# Patient Record
Sex: MALE | Race: WHITE | Employment: OTHER | URBAN - METROPOLITAN AREA
[De-identification: names, ages, dates, MRNs, and addresses within clinical notes are randomized per-mention and may not be internally consistent; named-entity substitution may affect disease eponyms.]

---

## 2020-09-14 ENCOUNTER — HOSPITAL ENCOUNTER (OUTPATIENT)
Dept: LAB | Facility: CLINIC | Age: 75
Discharge: HOME | End: 2020-09-14
Attending: PODIATRIST | Admitting: PODIATRIST
Payer: COMMERCIAL

## 2020-09-14 ENCOUNTER — OFFICE VISIT (OUTPATIENT)
Dept: PODIATRY | Facility: CLINIC | Age: 75
End: 2020-09-14
Payer: COMMERCIAL

## 2020-09-14 VITALS
SYSTOLIC BLOOD PRESSURE: 114 MMHG | BODY MASS INDEX: 37.19 KG/M2 | HEIGHT: 77 IN | DIASTOLIC BLOOD PRESSURE: 74 MMHG | WEIGHT: 315 LBS

## 2020-09-14 DIAGNOSIS — E66.01 MORBID OBESITY (H): ICD-10-CM

## 2020-09-14 DIAGNOSIS — L98.9 SKIN LESION OF FOOT: Primary | ICD-10-CM

## 2020-09-14 DIAGNOSIS — L98.9 SKIN LESION OF FOOT: ICD-10-CM

## 2020-09-14 DIAGNOSIS — M79.672 LEFT FOOT PAIN: ICD-10-CM

## 2020-09-14 PROCEDURE — 88312 SPECIAL STAINS GROUP 1: CPT | Performed by: PODIATRIST

## 2020-09-14 PROCEDURE — 88305 TISSUE EXAM BY PATHOLOGIST: CPT | Mod: 26 | Performed by: PODIATRIST

## 2020-09-14 PROCEDURE — 88312 SPECIAL STAINS GROUP 1: CPT | Mod: 26 | Performed by: PODIATRIST

## 2020-09-14 PROCEDURE — 11106 INCAL BX SKN SINGLE LES: CPT | Performed by: PODIATRIST

## 2020-09-14 PROCEDURE — 88305 TISSUE EXAM BY PATHOLOGIST: CPT | Performed by: PODIATRIST

## 2020-09-14 PROCEDURE — 99203 OFFICE O/P NEW LOW 30 MIN: CPT | Mod: 25 | Performed by: PODIATRIST

## 2020-09-14 ASSESSMENT — MIFFLIN-ST. JEOR: SCORE: 2439.97

## 2020-09-14 NOTE — PROGRESS NOTES
"  ASSESSMENT/PLAN:    Encounter Diagnoses   Name Primary?     Skin lesion of foot Yes     Left foot pain      Morbid obesity (H)      The hyperkeratotic tissue below the left third and fourth metatarsal heads is consistent with a callus from high pressure.  There was evidence of intraepidermal bleeding but no ulceration deep to this.    The skin lesion that is just distal to the callused area is different.  This was noted to be soft, boggy with macerated tissue.    Pairing of callus:     Using a #10 scalpel, the hyperkeratotic lesion plantar left foot was pared down to healthier normal underlying epithelium.    Incisional Skin Biopsy:    Due to the abnormal appearance of the soft tissues distal to the callus, excisional biopsy was recommended.  He was agreeable.  The area was prepped with alcohol solution.  Using a sterile tissue nippers, tissue was excised and placed in formalin.  It was sent to surgical pathology.  Bacitracin and a light dressing was applied.    I explained, depending on the pathology results, more investigation might be needed.  If this is a concerning lesion, a referral to Dr. Bean, dermatology, will be recommended.    We gave Rubén instructions for daily monitoring of the wound and wound cares.  The clinical signs of infection were reviewed.  He is to follow-up in clinic in 2 weeks.    All questions that he and his wife had were answered.      Body mass index is 41.5 kg/m .    Weight management plan: Patient was referred to their PCP to discuss a diet and exercise plan.      Marcelo Feliciano DPM, FACFAS, MS    [place] [facility]le [facility]      ____________________________________________________________________    HPI:         Chief Complaint: bruised foot on bottom and won't go away  Onset of problem: 5 months  Pain/ discomfort is described as:  \"hurts\"  Ratin/10   Frequency:  daily    The pain is made worse with standing, walking   Previous treatment: \"wife grinds " "it off\"    *There is no problem list on file for this patient.  *  *No past surgical history on file.*  *  No current outpatient medications on file.       ROS:     A 10-point review of systems was performed and is positive for that noted above in the HPI and as seen below.  All other areas are negative.     Numbness in feet?  yes   Calf pain with walking? no  Recent foot/ankle injury? no  Weight change over past 12 months? no  Self perception as overweight? yes  Recent flu-like symptoms? no  Joint pain other than feet ? no    Social History: Employment:  no;  Exercise/Physical activity:  no;  Tobacco use:  no  Social History     Socioeconomic History     Marital status:      Spouse name: Not on file     Number of children: Not on file     Years of education: Not on file     Highest education level: Not on file   Occupational History     Not on file   Social Needs     Financial resource strain: Not on file     Food insecurity     Worry: Not on file     Inability: Not on file     Transportation needs     Medical: Not on file     Non-medical: Not on file   Tobacco Use     Smoking status: Never Smoker     Smokeless tobacco: Never Used   Substance and Sexual Activity     Alcohol use: Not on file     Drug use: Not on file     Sexual activity: Not on file   Lifestyle     Physical activity     Days per week: Not on file     Minutes per session: Not on file     Stress: Not on file   Relationships     Social connections     Talks on phone: Not on file     Gets together: Not on file     Attends Restoration service: Not on file     Active member of club or organization: Not on file     Attends meetings of clubs or organizations: Not on file     Relationship status: Not on file     Intimate partner violence     Fear of current or ex partner: Not on file     Emotionally abused: Not on file     Physically abused: Not on file     Forced sexual activity: Not on file   Other Topics Concern     Not on file   Social History " "Narrative     Not on file       Family history:  No family history on file.    EXAM:    Vitals: /74   Ht 1.956 m (6' 5\")   Wt (!) 158.8 kg (350 lb)   BMI 41.50 kg/m    BMI: Body mass index is 41.5 kg/m .  Height: 6' 5\"    Constitutional/ general:  Pt is in no apparent distress, appears well-nourished.  Cooperative with history and physical exam.     Vascular:  Pedal pulses are faintly palpable bilaterally for both the DP and PT arteries.  CFT < 3 sec.  No edema.  Pedal hair growth noted.     Neuro:  Alert and oriented x 3. Coordinated gait.  Light touch sensation is diminished to the L4, L5, S1 distributions. No obvious deficits.  No evidence of neurological-based weakness, spasticity, or contracture in the lower extremities.     Derm: Thick hyperkeratotic lesion sub-left third and fourth metatarsal region.  Dark pigmentation suggesting intraepidermal bleeding.  Distal to this is a 1.5 cm in diameter area that is boggy feeling.  With debridement fluid did not drain.  A macerated, friable tissue was revealed.  Some of this was excised and sent to surgical pathology.  No deep wound or sinus tracts found.  No purulence erythema or edema.    Musculoskeletal:    Lower extremity muscle strength is normal.  Patient is ambulatory without an assistive device or brace .  No gross deformities.        "

## 2020-09-14 NOTE — PATIENT INSTRUCTIONS
Thank you for choosing [facility] / Foot & [facility]le Surgery!    [facility] [place] SCHEDULE SURGERY: [phone]   [address] BILLING QUESTIONS: [phone]   [place] [address] AFTER HOURS: [phone]   PH: [phone] [employer] [place] LINE:[phone]   FAX: [phone] APPOINTMENTS: [phone]     Follow up: 2 weeks    DR. ARANA'S WOUND CARE INSTRUCTIONS    1)  Keep the wound covered by a bandage when bathing.    2)  Gently clean the wound with soap water, separate from bath/shower water.      3)  Each day, apply a topical antibiotic ointment to the wound (Neosporin, Triple antibiotic, Bacitracin).   Cover with large band-aid or gauze.      5)  Please seek immediate medical attention if any increasing redness, drainage, smell, or pain related to the wound.     6)  Please return to clinic in the period of time requested by Dr. Arana.          SIGNS OF INFECTION    expanding redness around the wound     yellow or greenish-colored pus or cloudy wound drainage     red streaking spreading from the wound     increased swelling, tenderness, or pain around the wound     fever  *If you notice any of these signs of infection, call us right away!

## 2020-09-14 NOTE — LETTER
9/14/2020         RE: Rubén Bhat  [address]  [facility] [address]        Dear Colleague,    Thank you for referring your patient, Rubén Bhat, to the [facility]. Please see a copy of my visit note below.      ASSESSMENT/PLAN:    Encounter Diagnoses   Name Primary?     Skin lesion of foot Yes     Left foot pain      Morbid obesity (H)      The hyperkeratotic tissue below the left third and fourth metatarsal heads is consistent with a callus from high pressure.  There was evidence of intraepidermal bleeding but no ulceration deep to this.    The skin lesion that is just distal to the callused area is different.  This was noted to be soft, boggy with macerated tissue.    Pairing of callus:     Using a #10 scalpel, the hyperkeratotic lesion plantar left foot was pared down to healthier normal underlying epithelium.    Incisional Skin Biopsy:    Due to the abnormal appearance of the soft tissues distal to the callus, excisional biopsy was recommended.  He was agreeable.  The area was prepped with alcohol solution.  Using a sterile tissue nippers, tissue was excised and placed in formalin.  It was sent to surgical pathology.  Bacitracin and a light dressing was applied.    I explained, depending on the pathology results, more investigation might be needed.  If this is a concerning lesion, a referral to Dr. Bean, dermatology, will be recommended.    We gave Rubén instructions for daily monitoring of the wound and wound cares.  The clinical signs of infection were reviewed.  He is to follow-up in clinic in 2 weeks.    All questions that he and his wife had were answered.      Body mass index is 41.5 kg/m .    Weight management plan: Patient was referred to their PCP to discuss a diet and exercise plan.      Marcelo Feliciano, MILAN, FACFAS, MS    [place] [facility] Podiatry/Foot & Ankle Surgery      ____________________________________________________________________    HPI:         Chief  "Complaint: bruised foot on bottom and won't go away  Onset of problem: 5 months  Pain/ discomfort is described as:  \"hurts\"  Ratin/10   Frequency:  daily    The pain is made worse with standing, walking   Previous treatment: \"wife grinds it off\"    *There is no problem list on file for this patient.  *  *No past surgical history on file.*  *  No current outpatient medications on file.       ROS:     A 10-point review of systems was performed and is positive for that noted above in the HPI and as seen below.  All other areas are negative.     Numbness in feet?  yes   Calf pain with walking? no  Recent foot/ankle injury? no  Weight change over past 12 months? no  Self perception as overweight? yes  Recent flu-like symptoms? no  Joint pain other than feet ? no    Social History: Employment:  no;  Exercise/Physical activity:  no;  Tobacco use:  no  Social History     Socioeconomic History     Marital status:      Spouse name: Not on file     Number of children: Not on file     Years of education: Not on file     Highest education level: Not on file   Occupational History     Not on file   Social Needs     Financial resource strain: Not on file     Food insecurity     Worry: Not on file     Inability: Not on file     Transportation needs     Medical: Not on file     Non-medical: Not on file   Tobacco Use     Smoking status: Never Smoker     Smokeless tobacco: Never Used   Substance and Sexual Activity     Alcohol use: Not on file     Drug use: Not on file     Sexual activity: Not on file   Lifestyle     Physical activity     Days per week: Not on file     Minutes per session: Not on file     Stress: Not on file   Relationships     Social connections     Talks on phone: Not on file     Gets together: Not on file     Attends Latter day service: Not on file     Active member of club or organization: Not on file     Attends meetings of clubs or organizations: Not on file     Relationship status: Not on file     " "Intimate partner violence     Fear of current or ex partner: Not on file     Emotionally abused: Not on file     Physically abused: Not on file     Forced sexual activity: Not on file   Other Topics Concern     Not on file   Social History Narrative     Not on file       Family history:  No family history on file.    EXAM:    Vitals: /74   Ht 1.956 m (6' 5\")   Wt (!) 158.8 kg (350 lb)   BMI 41.50 kg/m    BMI: Body mass index is 41.5 kg/m .  Height: 6' 5\"    Constitutional/ general:  Pt is in no apparent distress, appears well-nourished.  Cooperative with history and physical exam.     Vascular:  Pedal pulses are faintly palpable bilaterally for both the DP and PT arteries.  CFT < 3 sec.  No edema.  Pedal hair growth noted.     Neuro:  Alert and oriented x 3. Coordinated gait.  Light touch sensation is diminished to the L4, L5, S1 distributions. No obvious deficits.  No evidence of neurological-based weakness, spasticity, or contracture in the lower extremities.     Derm: Thick hyperkeratotic lesion sub-left third and fourth metatarsal region.  Dark pigmentation suggesting intraepidermal bleeding.  Distal to this is a 1.5 cm in diameter area that is boggy feeling.  With debridement fluid did not drain.  A macerated, friable tissue was revealed.  Some of this was excised and sent to surgical pathology.  No deep wound or sinus tracts found.  No purulence erythema or edema.    Musculoskeletal:    Lower extremity muscle strength is normal.  Patient is ambulatory without an assistive device or brace .  No gross deformities.          Again, thank you for allowing me to participate in the care of your patient.        Sincerely,        Marcelo Feliciano, MILAN    "

## 2020-09-16 LAB — COPATH REPORT: NORMAL

## 2020-09-30 ENCOUNTER — OFFICE VISIT (OUTPATIENT)
Dept: PODIATRY | Facility: CLINIC | Age: 75
End: 2020-09-30
Payer: COMMERCIAL

## 2020-09-30 VITALS
WEIGHT: 315 LBS | SYSTOLIC BLOOD PRESSURE: 138 MMHG | HEIGHT: 77 IN | DIASTOLIC BLOOD PRESSURE: 89 MMHG | BODY MASS INDEX: 37.19 KG/M2 | HEART RATE: 91 BPM

## 2020-09-30 DIAGNOSIS — L97.521 SKIN ULCER OF LEFT FOOT, LIMITED TO BREAKDOWN OF SKIN (H): Primary | ICD-10-CM

## 2020-09-30 DIAGNOSIS — E66.01 MORBID OBESITY (H): ICD-10-CM

## 2020-09-30 DIAGNOSIS — R20.8 DECREASED SENSATION OF FOOT: ICD-10-CM

## 2020-09-30 PROCEDURE — 99213 OFFICE O/P EST LOW 20 MIN: CPT | Mod: 25 | Performed by: PODIATRIST

## 2020-09-30 PROCEDURE — 97597 DBRDMT OPN WND 1ST 20 CM/<: CPT | Performed by: PODIATRIST

## 2020-09-30 ASSESSMENT — MIFFLIN-ST. JEOR: SCORE: 2439.97

## 2020-09-30 NOTE — PROGRESS NOTES
"ASSESSMENT/PLAN:    Encounter Diagnoses   Name Primary?     Skin ulcer of left foot, limited to breakdown of skin (H) Yes     Decreased sensation of foot      Morbid obesity (H)      We reviewed the pathology report. No malignancy.  I suspect he has some degree of peripheral neuropathy. This along with foot structure and body weight has contributed to ulceration.   It appears to be healing. No clinical signs of infection.    Rigid soled shoes were recommended to offload the forefoot during the propulsive phase of gait.     Rubén ANGI Perlita is referred to [facility] for prescription orthoses.      I explained that if the ulcer persists, a walking boot might be needed. I also said that surgical intervention might be an option for a non healing wound. However, a transfer lesion is the risk.    Follow up in 3 weeks    Wound Care Recommendations:    1)  Keep the wound covered by a bandage when bathing.    2)  Gently clean the wound with soap water, separate from bath/shower water.      3)  Each day, apply a topical antibiotic ointment to the wound (Neosporin, Triple antibiotic, Bacitracin).   Cover with large band-aid or gauze.      5)  Please seek immediate medical attention if any increasing redness, drainage, smell, or pain related to the wound.     6)  Please return to clinic in the period of time requested by Dr. Feliciano.        Excisional Debridement    The excisional debridement procedure was discussed.  This included the goals of removing non-viable tissue, evaluating the full extent of wound, and promoting wound healing.  Mikedenise WHITLEY Perlita  provided verbal and written consent.  The \"Time Out\" was called.     Using a sterile #15 blade, tissue nippers, small scissors and forceps, excisional debridment of the plantar left foot ulcer was performed.  Debridement was carried out to the depth of deeper skin.   The hyperkeratotic eschar surrounding the wound was removed, by excising skin edges back to " "healthy, bleeding tissue .  Other non-viable tissue was excised. The ulcer base was scraped to remove bioburden and promote healing.  The area debrided was less than 20 square cm.   There was moderate bleeding with the procedure. No anesthesia was needed due to peripheral neuropathy.   A sterile dressing was applied.      Marcelo Feliciano DPM, JANEEN, MS    [place] [facility] of Podiatry/Foot & Ankle Surgery      ____________________________________________________________________    HPI:         Chief Complaint: follow up for a skin lesion that was biopsied 9/14/20  Onset of problem: 5 months  No pain  He has followed the cleansing and dressing recommendations  No new concerns    Patient Active Problem List   Diagnosis     Morbid obesity (H)     No past surgical history on file.  No current outpatient medications on file.       EXAM:    Vitals: /89   Pulse 91   Ht 1.956 m (6' 5\")   Wt (!) 158.8 kg (350 lb)   BMI 41.50 kg/m    BMI: Body mass index is 41.5 kg/m .  Height: 6' 5\"    Constitutional/ general:  Pt is in no apparent distress, appears well-nourished.  Cooperative with history and physical exam.      Vascular:  Pedal pulses are faintly palpable bilaterally for both the DP and PT arteries.  CFT < 3 sec.  No edema.  Pedal hair growth noted.      Neuro:  Alert and oriented x 3. Coordinated gait.  Light touch sensation is diminished to the L4, L5, S1 distributions. No obvious deficits.  No evidence of neurological-based weakness, spasticity, or contracture in the lower extremities.     Protective sensation is diminished in the left foot per [place] Monofilament testing.    Derm: Thick hyperkeratotic lesion sub-left third and fourth metatarsal region.  Dark pigmentation suggesting intraepidermal bleeding.  There is an ulceration, to deeper skin, ~ 3cm L x 1.5cm W.  No erythema, drainage or malodor.    Musculoskeletal:    Lower extremity muscle strength is normal.  Patient is ambulatory without an " assistive device or brace .  No gross deformities.      TO ADDENDUM   Status: Signed Out   Date Ordered:9/16/2020   Date Complete:9/16/2020   Date Reported:9/16/2020 13:17   Signed Out By: Maria Teresa Looney M.D.     INTERPRETATION:   This addendum is issued to include findings of PAS stain performed for   further evaluation, using appropriate   controls.  The PAS stain is negative for fungal forms.  All previously   reported findings remain unchanged.     __________________________________________     ORIGINAL REPORT:     SPECIMEN(S):   Skin biopsy, left foot plantar     FINAL DIAGNOSIS:   Skin, left plantar foot, biopsy-   -Superficial biopsy showing hyperkeratosis, epidermal hyperplasia, and   serum crust.   -Pending PAS stain (see forthcoming addendum).   -Negative for malignancy.   -See comment.     COMMENT:   The clinic note is reviewed and the finding of macerated tissue distal to   a callous is noted.     Please note that the evaluation is limited by superficial nature of biopsy    (the base of the lesion is not   visualized). Please correlate with clinical findings.     Electronically signed out by:     Maria Treesa Looney M.D.

## 2020-09-30 NOTE — LETTER
"    9/30/2020         RE: Rubén Bhat  [address]  [facility] [address]        Dear Colleague,    Thank you for referring your patient, Rubén Bhat, to the [facility]. Please see a copy of my visit note below.    ASSESSMENT/PLAN:    Encounter Diagnoses   Name Primary?     Skin ulcer of left foot, limited to breakdown of skin (H) Yes     Decreased sensation of foot      Morbid obesity (H)      We reviewed the pathology report. No malignancy.  I suspect he has some degree of peripheral neuropathy. This along with foot structure and body weight has contributed to ulceration.   It appears to be healing. No clinical signs of infection.    Rigid soled shoes were recommended to offload the forefoot during the propulsive phase of gait.     Rubén Bhat is referred to [facility] for prescription orthoses.      I explained that if the ulcer persists, a walking boot might be needed. I also said that surgical intervention might be an option for a non healing wound. However, a transfer lesion is the risk.    Follow up in 3 weeks    Wound Care Recommendations:    1)  Keep the wound covered by a bandage when bathing.    2)  Gently clean the wound with soap water, separate from bath/shower water.      3)  Each day, apply a topical antibiotic ointment to the wound (Neosporin, Triple antibiotic, Bacitracin).   Cover with large band-aid or gauze.      5)  Please seek immediate medical attention if any increasing redness, drainage, smell, or pain related to the wound.     6)  Please return to clinic in the period of time requested by Dr. Feliciano.        Excisional Debridement    The excisional debridement procedure was discussed.  This included the goals of removing non-viable tissue, evaluating the full extent of wound, and promoting wound healing.  Rubén Bhat  provided verbal and written consent.  The \"Time Out\" was called.     Using a sterile #15 blade, tissue nippers, small scissors " "and forceps, excisional debridment of the plantar left foot ulcer was performed.  Debridement was carried out to the depth of deeper skin.   The hyperkeratotic eschar surrounding the wound was removed, by excising skin edges back to healthy, bleeding tissue .  Other non-viable tissue was excised. The ulcer base was scraped to remove bioburden and promote healing.  The area debrided was less than 20 square cm.   There was moderate bleeding with the procedure. No anesthesia was needed due to peripheral neuropathy.   A sterile dressing was applied.      Marcelo Feliciano DPM, FACFAS, MS    [place] [facility] of Podiatry/Foot & Ankle Surgery      ____________________________________________________________________    HPI:         Chief Complaint: follow up for a skin lesion that was biopsied 9/14/20  Onset of problem: 5 months  No pain  He has followed the cleansing and dressing recommendations  No new concerns    Patient Active Problem List   Diagnosis     Morbid obesity (H)     No past surgical history on file.  No current outpatient medications on file.       EXAM:    Vitals: /89   Pulse 91   Ht 1.956 m (6' 5\")   Wt (!) 158.8 kg (350 lb)   BMI 41.50 kg/m    BMI: Body mass index is 41.5 kg/m .  Height: 6' 5\"    Constitutional/ general:  Pt is in no apparent distress, appears well-nourished.  Cooperative with history and physical exam.      Vascular:  Pedal pulses are faintly palpable bilaterally for both the DP and PT arteries.  CFT < 3 sec.  No edema.  Pedal hair growth noted.      Neuro:  Alert and oriented x 3. Coordinated gait.  Light touch sensation is diminished to the L4, L5, S1 distributions. No obvious deficits.  No evidence of neurological-based weakness, spasticity, or contracture in the lower extremities.     Protective sensation is diminished in the left foot per [place] Monofilament testing.    Derm: Thick hyperkeratotic lesion sub-left third and fourth metatarsal region.  Dark " pigmentation suggesting intraepidermal bleeding.  There is an ulceration, to deeper skin, ~ 3cm L x 1.5cm W.  No erythema, drainage or malodor.    Musculoskeletal:    Lower extremity muscle strength is normal.  Patient is ambulatory without an assistive device or brace .  No gross deformities.      TO ADDENDUM   Status: Signed Out   Date Ordered:9/16/2020   Date Complete:9/16/2020   Date Reported:9/16/2020 13:17   Signed Out By: Maria Teresa Looney M.D.     INTERPRETATION:   This addendum is issued to include findings of PAS stain performed for   further evaluation, using appropriate   controls.  The PAS stain is negative for fungal forms.  All previously   reported findings remain unchanged.     __________________________________________     ORIGINAL REPORT:     SPECIMEN(S):   Skin biopsy, left foot plantar     FINAL DIAGNOSIS:   Skin, left plantar foot, biopsy-   -Superficial biopsy showing hyperkeratosis, epidermal hyperplasia, and   serum crust.   -Pending PAS stain (see forthcoming addendum).   -Negative for malignancy.   -See comment.     COMMENT:   The clinic note is reviewed and the finding of macerated tissue distal to   a callous is noted.     Please note that the evaluation is limited by superficial nature of biopsy    (the base of the lesion is not   visualized). Please correlate with clinical findings.     Electronically signed out by:     Maria Teresa Looney M.D.        Again, thank you for allowing me to participate in the care of your patient.        Sincerely,        Marcelo Feliciano DPM

## 2020-09-30 NOTE — PATIENT INSTRUCTIONS
Thank you for choosing [facility] Podiatry / Foot & Ankle Surgery!    DR. ARANA'S CLINIC:     [facility] [place] SCHEDULE SURGERY: [phone]   [address] BILLING QUESTIONS: [phone]   [place] [address] AFTER HOURS: [phone]   PH: [phone] CONSUMER ELLIOTT LINE:[phone]   FAX: [phone] APPOINTMENTS: [phone]     Follow up: 3 weeks        Please read through the following handouts and if you have any questions, please feel free to call us or send a [employer] message!        [facility]  [facility]  [address]  [place] [address]  Phone: [phone]  Fax: [phone] [facility]  [address] [place] [address] [address]  [place] [address]  Phone: [phone]   Fax: [phone]   [facility]  [address]  [place] [address]  Phone: [phone]  Fax: [phone] [facility]  [address]  [place] [address]  Phone: [phone]   Fax: [phone]   [facility]   [address]  [place] [address]  Phone: [phone]  Fax: [phone] * Please call any location listed to make an appointment for a casting/fitting. Your referral was sent to their central office and they will all have the order on file.           Rubén to follow up with Primary Care provider regarding elevated blood pressure. (if equal or greater than 140/90)    (BMI) Body Mass Index  Many things can cause foot and ankle problems. Foot structure, activity level, foot mechanics and injuries are common causes of pain.  One very important issue that often goes unmentioned, is body weight.  Extra weight can cause increased stress on muscles, ligaments, bones and tendons. Sometimes just a few extra pounds is all it takes to put one over her/his threshold. Without reducing that stress, it can be difficult to alleviate pain.    Some people  are uncomfortable addressing this issue, but we feel it is important for you to think about it. As Foot & Ankle specialists, our job is addressing the lower extremity problem and possible causes.   Regarding extra body weight, we encourage patients to discuss diet and weight management plans with their primary care doctors. It is this team approach that gives you the best opportunity for pain relief and getting you back on your feet.

## 2020-10-21 ENCOUNTER — OFFICE VISIT (OUTPATIENT)
Dept: PODIATRY | Facility: CLINIC | Age: 75
End: 2020-10-21
Payer: COMMERCIAL

## 2020-10-21 VITALS
HEIGHT: 77 IN | DIASTOLIC BLOOD PRESSURE: 90 MMHG | SYSTOLIC BLOOD PRESSURE: 120 MMHG | WEIGHT: 315 LBS | BODY MASS INDEX: 37.19 KG/M2

## 2020-10-21 DIAGNOSIS — R20.8 DECREASED SENSATION OF FOOT: ICD-10-CM

## 2020-10-21 DIAGNOSIS — L97.521 ULCER OF LEFT FOOT, LIMITED TO BREAKDOWN OF SKIN (H): Primary | ICD-10-CM

## 2020-10-21 PROCEDURE — 97597 DBRDMT OPN WND 1ST 20 CM/<: CPT | Performed by: PODIATRIST

## 2020-10-21 RX ORDER — WARFARIN SODIUM 5 MG/1
TABLET ORAL
COMMUNITY
Start: 2020-05-29

## 2020-10-21 RX ORDER — DILTIAZEM HYDROCHLORIDE 180 MG/1
CAPSULE, EXTENDED RELEASE ORAL 2 TIMES DAILY
COMMUNITY
Start: 2020-03-03

## 2020-10-21 RX ORDER — TRIAMTERENE/HYDROCHLOROTHIAZID 37.5-25 MG
TABLET ORAL DAILY
COMMUNITY
Start: 2019-07-25

## 2020-10-21 RX ORDER — FUROSEMIDE 40 MG
40 TABLET ORAL DAILY
COMMUNITY
Start: 2020-10-01

## 2020-10-21 RX ORDER — WARFARIN SODIUM 1 MG/1
TABLET ORAL
COMMUNITY
Start: 2020-05-04

## 2020-10-21 RX ORDER — OMEPRAZOLE 40 MG/1
40 CAPSULE, DELAYED RELEASE ORAL DAILY
COMMUNITY
Start: 2019-08-22

## 2020-10-21 RX ORDER — ATORVASTATIN CALCIUM 20 MG/1
TABLET, FILM COATED ORAL DAILY
COMMUNITY
Start: 2020-02-06

## 2020-10-21 RX ORDER — METOPROLOL SUCCINATE 25 MG/1
25 TABLET, EXTENDED RELEASE ORAL DAILY
COMMUNITY
Start: 2020-03-03

## 2020-10-21 RX ORDER — TAMSULOSIN HYDROCHLORIDE 0.4 MG/1
2 CAPSULE ORAL DAILY
COMMUNITY
Start: 2020-03-03

## 2020-10-21 ASSESSMENT — MIFFLIN-ST. JEOR: SCORE: 2485.33

## 2020-10-21 NOTE — PATIENT INSTRUCTIONS
Thank you for choosing [facility]iatry / Foot & Ankle Surgery!    DR. ARANA'S CLINIC:     [facility] SCHEDULE SURGERY: [phone]   [address] BILLING QUESTIONS: [phone]   [place] [address] AFTER HOURS: [phone]   PH: [phone] CONSUMER PRICE LINE:[phone]   FAX: [phone] APPOINTMENTS: [phone]     Follow up: as needed      Mikeno to follow up with Primary Care provider regarding elevated blood pressure. (if equal or greater than 140/90)    (BMI) Body Mass Index  Many things can cause foot and ankle problems. Foot structure, activity level, foot mechanics and injuries are common causes of pain.  One very important issue that often goes unmentioned, is body weight.  Extra weight can cause increased stress on muscles, ligaments, bones and tendons. Sometimes just a few extra pounds is all it takes to put one over her/his threshold. Without reducing that stress, it can be difficult to alleviate pain.    Some people are uncomfortable addressing this issue, but we feel it is important for you to think about it. As Foot & Ankle specialists, our job is addressing the lower extremity problem and possible causes.   Regarding extra body weight, we encourage patients to discuss diet and weight management plans with their primary care doctors. It is this team approach that gives you the best opportunity for pain relief and getting you back on your feet.

## 2020-10-21 NOTE — LETTER
10/21/2020         RE: Rubén Bhat  [id number]  [facility] [address]        Dear Colleague,    Thank you for referring your patient, Rubén Bhat, to the [facility] PODI[facility]. Please see a copy of my visit note below.    ASSESSMENT/PLAN:    Encounter Diagnoses   Name Primary?     Ulcer of left foot, limited to breakdown of skin (H) Yes     Decreased sensation of foot      The ulcer has nearly healed.     He is to continue with athletic shoes, cushioned insert, filing, and daily foot inspection.  I encouraged him to return to clinic if and when he notices darker pigmentation within the lesion.  This typically suggest some intraepidermal bleeding and is considered preulcerative.  He is agreeable to this.  I also told him I am willing the pared the callus down periodically but it might be an out-of-pocket cost to him.  Cost was reviewed and he is fine with it.    Using a #15 scalpel, excisional debridement of the left foot ulcer was performed down to deeper skin.    He has a follow-up on an as-needed basis.    Body mass index is 42.69 kg/m .    Marcelo Feliciano DPM, FACFAS, [facility] Department of Podiatry/Foot & Ankle Surgery      ____________________________________________________________________    HPI:         Chief Complaint: follow up for a skin lesion/ ulcer that was biopsied 9/14/20  Onset of problem: 6 months  No pain  He has followed the cleansing and dressing recommendations  His wife has helped file the lesion down  He is wearing athletic shoes with an aperture cut in the insert    Patient Active Problem List   Diagnosis     Morbid obesity (H)     No past surgical history on file.  Current Outpatient Medications   Medication Sig Dispense Refill     atorvastatin (LIPITOR) 20 MG tablet daily       diltiazem ER (DILT-XR) 180 MG 24 hr capsule Take by mouth 2 times daily       furosemide (LASIX) 40 MG tablet Take 40 mg by mouth daily       metoprolol succinate ER (TOPROL-XL)  "25 MG 24 hr tablet Take 25 mg by mouth daily       omeprazole (PRILOSEC) 40 MG DR capsule Take 40 mg by mouth daily       tamsulosin (FLOMAX) 0.4 MG capsule Take 2 capsules by mouth daily       triamterene-HCTZ (MAXZIDE-25) 37.5-25 MG tablet Take by mouth daily       warfarin ANTICOAGULANT (COUMADIN) 1 MG tablet        warfarin ANTICOAGULANT (COUMADIN) 5 MG tablet 8 mg total per day         EXAM:    Vitals: BP (!) 120/90   Ht 1.956 m (6' 5\")   Wt (!) 163.3 kg (360 lb)   BMI 42.69 kg/m    BMI: Body mass index is 42.69 kg/m .  Height: 6' 5\"    Constitutional/ general:  Pt is in no apparent distress, appears well-nourished.  Cooperative with history and physical exam.      Vascular:  Pedal pulses are faintly palpable bilaterally for both the DP and PT arteries.  CFT < 3 sec.  No edema.  Pedal hair growth noted.      Neuro:  Alert and oriented x 3. Coordinated gait.  Light touch sensation is diminished to the L4, L5, S1 distributions. No obvious deficits.  No evidence of neurological-based weakness, spasticity, or contracture in the lower extremities.      Protective sensation is diminished in the left foot per [place]-Greg[employer] Monofilament testing.     Derm: Hyperkeratotic lesion sub-left third and fourth metatarsal region.  Dark pigmentation suggesting intraepidermal bleeding.  Post pairing, minimal ulceration and only to the depth of deeper skin.     Musculoskeletal:    Lower extremity muscle strength is normal.  Patient is ambulatory without an assistive device or brace .  No gross deformities.       TO ADDENDUM   Status: Signed Out   Date Ordered:9/16/2020   Date Complete:9/16/2020   Date Reported:9/16/2020 13:17   Signed Out By: Maria Teresa Looney M.D.     INTERPRETATION:   This addendum is issued to include findings of PAS stain performed for   further evaluation, using appropriate   controls.  The PAS stain is negative for fungal forms.  All previously   reported findings remain unchanged. "     __________________________________________     ORIGINAL REPORT:     SPECIMEN(S):   Skin biopsy, left foot plantar     FINAL DIAGNOSIS:   Skin, left plantar foot, biopsy-   -Superficial biopsy showing hyperkeratosis, epidermal hyperplasia, and   serum crust.   -Pending PAS stain (see forthcoming addendum).   -Negative for malignancy.   -See comment.     COMMENT:   The clinic note is reviewed and the finding of macerated tissue distal to   a callous is noted.     Please note that the evaluation is limited by superficial nature of biopsy    (the base of the lesion is not   visualized). Please correlate with clinical findings.     Electronically signed out by:     Maria Teresa Looney M.D.          Again, thank you for allowing me to participate in the care of your patient.        Sincerely,        Marcelo Feliciano DPM

## 2020-10-21 NOTE — PROGRESS NOTES
ASSESSMENT/PLAN:    Encounter Diagnoses   Name Primary?     Ulcer of left foot, limited to breakdown of skin (H) Yes     Decreased sensation of foot      The ulcer has nearly healed.     He is to continue with athletic shoes, cushioned insert, filing, and daily foot inspection.  I encouraged him to return to clinic if and when he notices darker pigmentation within the lesion.  This typically suggest some intraepidermal bleeding and is considered preulcerative.  He is agreeable to this.  I also told him I am willing the pared the callus down periodically but it might be an out-of-pocket cost to him.  Cost was reviewed and he is fine with it.    Using a #15 scalpel, excisional debridement of the left foot ulcer was performed down to deeper skin.    He has a follow-up on an as-needed basis.    Body mass index is 42.69 kg/m .    Marcelo Feliciano DPM, JANEEN, MS    [place] Department of Podiatry/Foot & Ankle Surgery      ____________________________________________________________________    HPI:         Chief Complaint: follow up for a skin lesion/ ulcer that was biopsied 9/14/20  Onset of problem: 6 months  No pain  He has followed the cleansing and dressing recommendations  His wife has helped file the lesion down  He is wearing athletic shoes with an aperture cut in the insert    Patient Active Problem List   Diagnosis     Morbid obesity (H)     No past surgical history on file.  Current Outpatient Medications   Medication Sig Dispense Refill     atorvastatin (LIPITOR) 20 MG tablet daily       diltiazem ER (DILT-XR) 180 MG 24 hr capsule Take by mouth 2 times daily       furosemide (LASIX) 40 MG tablet Take 40 mg by mouth daily       metoprolol succinate ER (TOPROL-XL) 25 MG 24 hr tablet Take 25 mg by mouth daily       omeprazole (PRILOSEC) 40 MG DR capsule Take 40 mg by mouth daily       tamsulosin (FLOMAX) 0.4 MG capsule Take 2 capsules by mouth daily       triamterene-HCTZ (MAXZIDE-25) 37.5-25 MG tablet Take by mouth  "daily       warfarin ANTICOAGULANT (COUMADIN) 1 MG tablet        warfarin ANTICOAGULANT (COUMADIN) 5 MG tablet 8 mg total per day         EXAM:    Vitals: BP (!) 120/90   Ht 1.956 m (6' 5\")   Wt (!) 163.3 kg (360 lb)   BMI 42.69 kg/m    BMI: Body mass index is 42.69 kg/m .  Height: 6' 5\"    Constitutional/ general:  Pt is in no apparent distress, appears well-nourished.  Cooperative with history and physical exam.      Vascular:  Pedal pulses are faintly palpable bilaterally for both the DP and PT arteries.  CFT < 3 sec.  No edema.  Pedal hair growth noted.      Neuro:  Alert and oriented x 3. Coordinated gait.  Light touch sensation is diminished to the L4, L5, S1 distributions. No obvious deficits.  No evidence of neurological-based weakness, spasticity, or contracture in the lower extremities.      Protective sensation is diminished in the left foot per [place]-Gregory Monofilament testing.     Derm: Hyperkeratotic lesion sub-left third and fourth metatarsal region.  Dark pigmentation suggesting intraepidermal bleeding.  Post pairing, minimal ulceration and only to the depth of deeper skin.     Musculoskeletal:    Lower extremity muscle strength is normal.  Patient is ambulatory without an assistive device or brace .  No gross deformities.       TO ADDENDUM   Status: Signed Out   Date Ordered:9/16/2020   Date Complete:9/16/2020   Date Reported:9/16/2020 13:17   Signed Out By: Maria Teresa Looney M.D.     INTERPRETATION:   This addendum is issued to include findings of PAS stain performed for   further evaluation, using appropriate   controls.  The PAS stain is negative for fungal forms.  All previously   reported findings remain unchanged.     __________________________________________     ORIGINAL REPORT:     SPECIMEN(S):   Skin biopsy, left foot plantar     FINAL DIAGNOSIS:   Skin, left plantar foot, biopsy-   -Superficial biopsy showing hyperkeratosis, epidermal hyperplasia, and   serum crust.   -Pending PAS " stain (see forthcoming addendum).   -Negative for malignancy.   -See comment.     COMMENT:   The clinic note is reviewed and the finding of macerated tissue distal to   a callous is noted.     Please note that the evaluation is limited by superficial nature of biopsy    (the base of the lesion is not   visualized). Please correlate with clinical findings.     Electronically signed out by:     Maria Teresa Looney M.D.

## 2021-02-24 ENCOUNTER — OFFICE VISIT (OUTPATIENT)
Dept: PODIATRY | Facility: CLINIC | Age: 76
End: 2021-02-24
Payer: COMMERCIAL

## 2021-02-24 VITALS
HEIGHT: 77 IN | DIASTOLIC BLOOD PRESSURE: 80 MMHG | WEIGHT: 315 LBS | BODY MASS INDEX: 37.19 KG/M2 | SYSTOLIC BLOOD PRESSURE: 126 MMHG

## 2021-02-24 DIAGNOSIS — L97.521 ULCER OF LEFT FOOT, LIMITED TO BREAKDOWN OF SKIN (H): Primary | ICD-10-CM

## 2021-02-24 PROCEDURE — 97597 DBRDMT OPN WND 1ST 20 CM/<: CPT | Performed by: PODIATRIST

## 2021-02-24 ASSESSMENT — MIFFLIN-ST. JEOR: SCORE: 2486.24

## 2021-02-24 NOTE — PROGRESS NOTES
"ASSESSMENT:  Encounter Diagnosis   Name Primary?     Ulcer of left foot, limited to breakdown of skin (H) Yes     MEDICAL DECISION MAKING:    Both feet are stable.   I suspect he has some degree of peripheral neuropathy.   Using a #15 scalpel the left foot lesion was paired down and debrided to the depth of deeper skin.  I recommend that he cleanse the area daily and applies a topical antibiotic and Band-Aid.  With the hyperkeratotic tissue removed, this should heal up in a timely fashion.  His current shoe with insert is appropriate for offloading.  I suggest he return to clinic every 1 to 2 months for evaluation and paring/debridement of this lesion if needed.    Disclaimer: This note consists of symbols derived from keyboarding, dictation and/or voice recognition software. As a result, there may be errors in the script that have gone undetected. Please consider this when interpreting information found in this chart.    Marcelo Feliciano DPM, FACFAS, MS    [place] Department of Podiatry/Foot & Ankle Surgery      ____________________________________________________________________    HPI:         Chief Complaint: follow up for a callus of the left foot with history of prior ulceration and biopsy.   No current foot pain  He is wearing a quality athletic type shoe with soft insert. An aperture is cut below the area of concern.  He files this area down periodically.    He also asks about a \"red spot\" on his plantar right foot. His wife was concerned about this. She assists with monitoring his feet. He has some degree of peripheral neuropathy.     *No past medical history on file.*  *No past surgical history on file.*  *  Current Outpatient Medications   Medication Sig Dispense Refill     atorvastatin (LIPITOR) 20 MG tablet daily       diltiazem ER (DILT-XR) 180 MG 24 hr capsule Take by mouth 2 times daily       furosemide (LASIX) 40 MG tablet Take 40 mg by mouth daily       metoprolol succinate ER (TOPROL-XL) 25 MG 24 hr " "tablet Take 25 mg by mouth daily       omeprazole (PRILOSEC) 40 MG DR capsule Take 40 mg by mouth daily       tamsulosin (FLOMAX) 0.4 MG capsule Take 2 capsules by mouth daily       triamterene-HCTZ (MAXZIDE-25) 37.5-25 MG tablet Take by mouth daily       warfarin ANTICOAGULANT (COUMADIN) 1 MG tablet        warfarin ANTICOAGULANT (COUMADIN) 5 MG tablet 8 mg total per day           EXAM:    Vitals: /80   Ht 1.956 m (6' 5\")   Wt (!) 163.4 kg (360 lb 3.2 oz)   BMI 42.71 kg/m    BMI: Body mass index is 42.71 kg/m .    Vascular:  Pedal pulses are faintly palpable bilaterally for both the DP and PT arteries.  CFT < 3 sec.  No edema.  Pedal hair growth noted.      Neuro:  Alert and oriented x 3. Coordinated gait.  Light touch sensation is diminished to the L4, L5, S1 distributions. No obvious deficits.  No evidence of neurological-based weakness, spasticity, or contracture in the lower extremities.      Protective sensation is diminished in the left foot per [place]-Gregory Monofilament testing.     Derm: Hyperkeratotic lesion sub-left third and fourth metatarsal region.  Dark pigmentation suggesting intraepidermal bleeding.  Post pairing, minimal ulceration and only to the depth of deeper skin.     Musculoskeletal:    Lower extremity muscle strength is normal.  Patient is ambulatory without an assistive device or brace .  No gross deformities.       TO ADDENDUM   Status: Signed Out   Date Ordered:9/16/2020   Date Complete:9/16/2020   Date Reported:9/16/2020 13:17   Signed Out By: Maria Teresa Looney M.D.     INTERPRETATION:   This addendum is issued to include findings of PAS stain performed for   further evaluation, using appropriate   controls.  The PAS stain is negative for fungal forms.  All previously   reported findings remain unchanged.     __________________________________________     ORIGINAL REPORT:     SPECIMEN(S):   Skin biopsy, left foot plantar     FINAL DIAGNOSIS:   Skin, left plantar foot, biopsy- "   -Superficial biopsy showing hyperkeratosis, epidermal hyperplasia, and   serum crust.   -Pending PAS stain (see forthcoming addendum).   -Negative for malignancy.   -See comment.     COMMENT:   The clinic note is reviewed and the finding of macerated tissue distal to   a callous is noted.     Please note that the evaluation is limited by superficial nature of biopsy    (the base of the lesion is not   visualized). Please correlate with clinical findings.     Electronically signed out by:     Maria Teresa Looney M.D.

## 2021-02-24 NOTE — LETTER
"    2/24/2021         RE: Rubén Bhat  [id number]  [facility] [address]        Dear Colleague,    Thank you for referring your patient, Rubén Bhat, to the [facility] PODI[facility]. Please see a copy of my visit note below.    ASSESSMENT:  Encounter Diagnosis   Name Primary?     Ulcer of left foot, limited to breakdown of skin (H) Yes     MEDICAL DECISION MAKING:    Both feet are stable.   I suspect he has some degree of peripheral neuropathy.   Using a #15 scalpel the left foot lesion was paired down and debrided to the depth of deeper skin.  I recommend that he cleanse the area daily and applies a topical antibiotic and Band-Aid.  With the hyperkeratotic tissue removed, this should heal up in a timely fashion.  His current shoe with insert is appropriate for offloading.  I suggest he return to clinic every 1 to 2 months for evaluation and paring/debridement of this lesion if needed.    Disclaimer: This note consists of symbols derived from keyboarding, dictation and/or voice recognition software. As a result, there may be errors in the script that have gone undetected. Please consider this when interpreting information found in this chart.    Marcelo Feliciano DPM, FACFAS, MS    [place] Department of Podiatry/Foot & Ankle Surgery      ____________________________________________________________________    HPI:         Chief Complaint: follow up for a callus of the left foot with history of prior ulceration and biopsy.   No current foot pain  He is wearing a quality athletic type shoe with soft insert. An aperture is cut below the area of concern.  He files this area down periodically.    He also asks about a \"red spot\" on his plantar right foot. His wife was concerned about this. She assists with monitoring his feet. He has some degree of peripheral neuropathy.     *No past medical history on file.*  *No past surgical history on file.*  *  Current Outpatient Medications   Medication Sig " "Dispense Refill     atorvastatin (LIPITOR) 20 MG tablet daily       diltiazem ER (DILT-XR) 180 MG 24 hr capsule Take by mouth 2 times daily       furosemide (LASIX) 40 MG tablet Take 40 mg by mouth daily       metoprolol succinate ER (TOPROL-XL) 25 MG 24 hr tablet Take 25 mg by mouth daily       omeprazole (PRILOSEC) 40 MG DR capsule Take 40 mg by mouth daily       tamsulosin (FLOMAX) 0.4 MG capsule Take 2 capsules by mouth daily       triamterene-HCTZ (MAXZIDE-25) 37.5-25 MG tablet Take by mouth daily       warfarin ANTICOAGULANT (COUMADIN) 1 MG tablet        warfarin ANTICOAGULANT (COUMADIN) 5 MG tablet 8 mg total per day           EXAM:    Vitals: /80   Ht 1.956 m (6' 5\")   Wt (!) 163.4 kg (360 lb 3.2 oz)   BMI 42.71 kg/m    BMI: Body mass index is 42.71 kg/m .    Vascular:  Pedal pulses are faintly palpable bilaterally for both the DP and PT arteries.  CFT < 3 sec.  No edema.  Pedal hair growth noted.      Neuro:  Alert and oriented x 3. Coordinated gait.  Light touch sensation is diminished to the L4, L5, S1 distributions. No obvious deficits.  No evidence of neurological-based weakness, spasticity, or contracture in the lower extremities.      Protective sensation is diminished in the left foot per [place]-Gregory Monofilament testing.     Derm: Hyperkeratotic lesion sub-left third and fourth metatarsal region.  Dark pigmentation suggesting intraepidermal bleeding.  Post pairing, minimal ulceration and only to the depth of deeper skin.     Musculoskeletal:    Lower extremity muscle strength is normal.  Patient is ambulatory without an assistive device or brace .  No gross deformities.       TO ADDENDUM   Status: Signed Out   Date Ordered:9/16/2020   Date Complete:9/16/2020   Date Reported:9/16/2020 13:17   Signed Out By: Maria Teresa Looney M.D.     INTERPRETATION:   This addendum is issued to include findings of PAS stain performed for   further evaluation, using appropriate   controls.  The PAS stain " is negative for fungal forms.  All previously   reported findings remain unchanged.     __________________________________________     ORIGINAL REPORT:     SPECIMEN(S):   Skin biopsy, left foot plantar     FINAL DIAGNOSIS:   Skin, left plantar foot, biopsy-   -Superficial biopsy showing hyperkeratosis, epidermal hyperplasia, and   serum crust.   -Pending PAS stain (see forthcoming addendum).   -Negative for malignancy.   -See comment.     COMMENT:   The clinic note is reviewed and the finding of macerated tissue distal to   a callous is noted.     Please note that the evaluation is limited by superficial nature of biopsy    (the base of the lesion is not   visualized). Please correlate with clinical findings.     Electronically signed out by:     Maria Teresa Looney M.D.       Again, thank you for allowing me to participate in the care of your patient.        Sincerely,        Marcelo Feliciano DPM

## 2021-02-24 NOTE — PATIENT INSTRUCTIONS
Wound Care Recommendations:    1)  Keep the wound covered by a bandage when bathing.    2)  Gently clean the wound with soap water, separate from bath/shower water.      3)  Each day, apply a topical antibiotic ointment to the wound (Neosporin, Triple antibiotic, Bacitracin).   Cover with large band-aid or gauze.      5)  Please seek immediate medical attention if any increasing redness, drainage, smell, or pain related to the wound.     6)  Please return to clinic in the period of time requested by Dr. Feliciano.        SIGNS OF INFECTION    expanding redness around the wound     yellow or greenish-colored pus or cloudy wound drainage     red streaking spreading from the wound     increased swelling, tenderness, or pain around the wound     fever  *If you notice any of these signs of infection, call us right away!

## 2021-05-20 ENCOUNTER — OFFICE VISIT (OUTPATIENT)
Dept: PODIATRY | Facility: CLINIC | Age: 76
End: 2021-05-20
Payer: COMMERCIAL

## 2021-05-20 VITALS
DIASTOLIC BLOOD PRESSURE: 78 MMHG | WEIGHT: 315 LBS | SYSTOLIC BLOOD PRESSURE: 128 MMHG | HEIGHT: 77 IN | BODY MASS INDEX: 37.19 KG/M2

## 2021-05-20 DIAGNOSIS — L97.521 ULCER OF LEFT FOOT, LIMITED TO BREAKDOWN OF SKIN (H): Primary | ICD-10-CM

## 2021-05-20 PROCEDURE — 97597 DBRDMT OPN WND 1ST 20 CM/<: CPT | Performed by: PODIATRIST

## 2021-05-20 PROCEDURE — 99213 OFFICE O/P EST LOW 20 MIN: CPT | Mod: 25 | Performed by: PODIATRIST

## 2021-05-20 RX ORDER — MULTIPLE VITAMINS W/ MINERALS TAB 9MG-400MCG
1 TAB ORAL DAILY
COMMUNITY

## 2021-05-20 ASSESSMENT — MIFFLIN-ST. JEOR: SCORE: 2489.87

## 2021-05-20 NOTE — PROGRESS NOTES
"ASSESSMENT:  Encounter Diagnosis   Name Primary?     Ulcer of left foot, limited to breakdown of skin (H) Yes     MEDICAL DECISION MAKING:  I am not sure why he forms this wound.  I suspect he has peripheral neuropathy, along with a higher arch foot structure, this is a high pressure point.  The majority of the tissue appears to be thick, macerated hyperkeratotic tissue.    Excisional debridement was performed, please see below.    He is to cleanse the area daily, blot it dry, apply topical antibiotic to the central aspect and Betadine to the rest of the macerated tissue.    I support his ongoing use of cushioned inserts in his current shoes.  I explained how a stiffer soled shoe can help offload the forefoot during the propulsive phase of gait.  I also revisited the option of a custom orthotic devices.  He will consider this.    Follow-up in 1 month    Excisional Debridement    The excisional debridement procedure was discussed.  This included the goals of removing non-viable tissue, evaluating the full extent of wound, and promoting wound healing.  Rubén Bhat  provided verbal and written consent.  The \"Time Out\" was called.     Using a sterile #15 blade and tissue nippers, excisional debridment of the left foot ulcer was performed.  Debridement was carried out to the depth of skin.   The hyperkeratotic eschar surrounding the wound was removed, by excising skin edges back to healthy, bleeding tissue .  Other non-viable tissue was excised. The ulcer base was scraped to remove bioburden and promote healing.  The area debrided was less than 20 square cm.   There was moderate bleeding with the procedure. No anesthesia was needed due to peripheral neuropathy.   A sterile dressing was applied.      Disclaimer: This note consists of symbols derived from keyboarding, dictation and/or voice recognition software. As a result, there may be errors in the script that have gone undetected. Please consider this when " "interpreting information found in this chart.    Marcelo Feliciano, MILAN, FACFAS, MS    [place] [facility] of Podiatry/Foot & Ankle Surgery      ____________________________________________________________________    HPI:       Chief Complaint: Return for a callus and blister plantar left foot.  I last evaluated him on 2/24/2021.  At that time he had an ulceration limited to the depth of deeper skin.  I recommended he follow-up every 1 to 2 months.  His wife assists with wound cares.  She may became concerned when a new blister formed.  There is discoloration.  She was worried he would \"bleed out,\" as he recently started Xarelto.  He denies pain.  He has some form of peripheral neuropathy.  He has attempted to use a cushion insert with a hole cut below the lesion.  He is wearing new athletic shoes.    Prior biopsy of the lesion was negative for malignancy.    *No past medical history on file.*  *No past surgical history on file.*  *  Current Outpatient Medications   Medication Sig Dispense Refill     atorvastatin (LIPITOR) 20 MG tablet daily       diltiazem ER (DILT-XR) 180 MG 24 hr capsule Take by mouth 2 times daily       furosemide (LASIX) 40 MG tablet Take 40 mg by mouth daily       multivitamin w/minerals (MULTI-VITAMIN) tablet Take 1 tablet by mouth daily       omeprazole (PRILOSEC) 40 MG DR capsule Take 40 mg by mouth daily       rivaroxaban ANTICOAGULANT (XARELTO) 20 MG TABS tablet Take 20 mg by mouth daily (with dinner)       tamsulosin (FLOMAX) 0.4 MG capsule Take 2 capsules by mouth daily       triamterene-HCTZ (MAXZIDE-25) 37.5-25 MG tablet Take by mouth daily       metoprolol succinate ER (TOPROL-XL) 25 MG 24 hr tablet Take 25 mg by mouth daily       warfarin ANTICOAGULANT (COUMADIN) 1 MG tablet        warfarin ANTICOAGULANT (COUMADIN) 5 MG tablet 8 mg total per day           EXAM:    Vitals: /78   Ht 1.956 m (6' 5\")   Wt (!) 163.7 kg (361 lb)   BMI 42.81 kg/m    BMI: Body mass index is 42.81 " kg/m .    Constitutional:  Rubén Bhat is in no apparent distress, appears well-nourished.  Cooperative with history and physical exam.    Vascular:  Pedal pulses are faintly palpable bilaterally for both the DP and PT arteries.  CFT < 3 sec.  No edema.  Pedal hair growth noted.      Neuro:  Alert and oriented x 3. Coordinated gait.  Light touch sensation is diminished to the L4, L5, S1 distributions. No obvious deficits.  No evidence of neurological-based weakness, spasticity, or contracture in the lower extremities.      Protective sensation is diminished in the left foot per [place] Monofilament testing.     Derm: Hyperkeratotic lesion sub-left third and fourth metatarsal region.  Dark pigmentation suggesting intraepidermal bleeding.  From this region running distally is a bullous lesion with purpleish fluid. The skin is intact    Post excisional debridement of the region, there is an area measuring 1.5 cm in width by 3 cm in length of macerated skin.  In the central aspect of the wound is slightly deeper, probing 0.3 cm to a soft endpoint.  This is considered to a level of deeper skin.  The appearance is that of macerated hyperkeratotic skin.    No surrounding erythema or edema.  No malodor or purulence.    Musculoskeletal:    Lower extremity muscle strength is normal.  Patient is ambulatory without an assistive device or brace . High medial longitudinal arches.

## 2021-05-20 NOTE — PATIENT INSTRUCTIONS
Thank you for choosing [facility] Podiatry / Foot & Ankle Surgery!    DR. ARANA'S CLINIC LOCATIONS     [facility] SURGERY: [phone]   [address] [place] [address] APPOINTMENTS: [phone]   [place] [address] BILLING QUESTIONS: [phone]  [phone] RADIOLOGY: [phone]       [facility]    [id number]    [place] [address]    [phone]  [phone]

## 2021-05-20 NOTE — LETTER
"    5/20/2021         RE: Rubén Bhat  [id number]  [facility] [address]        Dear Colleague,    Thank you for referring your patient, Rubén Bhat, to the [facility] PODIATRY. Please see a copy of my visit note below.    ASSESSMENT:  Encounter Diagnosis   Name Primary?     Ulcer of left foot, limited to breakdown of skin (H) Yes     MEDICAL DECISION MAKING:  I am not sure why he forms this wound.  I suspect he has peripheral neuropathy, along with a higher arch foot structure, this is a high pressure point.  The majority of the tissue appears to be thick, macerated hyperkeratotic tissue.    Excisional debridement was performed, please see below.    He is to cleanse the area daily, blot it dry, apply topical antibiotic to the central aspect and Betadine to the rest of the macerated tissue.    I support his ongoing use of cushioned inserts in his current shoes.  I explained how a stiffer soled shoe can help offload the forefoot during the propulsive phase of gait.  I also revisited the option of a custom orthotic devices.  He will consider this.    Follow-up in 1 month    Excisional Debridement    The excisional debridement procedure was discussed.  This included the goals of removing non-viable tissue, evaluating the full extent of wound, and promoting wound healing.  Rubén Bhat  provided verbal and written consent.  The \"Time Out\" was called.     Using a sterile #15 blade and tissue nippers, excisional debridment of the left foot ulcer was performed.  Debridement was carried out to the depth of skin.   The hyperkeratotic eschar surrounding the wound was removed, by excising skin edges back to healthy, bleeding tissue .  Other non-viable tissue was excised. The ulcer base was scraped to remove bioburden and promote healing.  The area debrided was less than 20 square cm.   There was moderate bleeding with the procedure. No anesthesia was needed due to peripheral neuropathy.   A " "sterile dressing was applied.      Disclaimer: This note consists of symbols derived from keyboarding, dictation and/or voice recognition software. As a result, there may be errors in the script that have gone undetected. Please consider this when interpreting information found in this chart.    Marcelo Feliciano, MILAN, FACFAS, MS [place] Department of Podiatry/Foot & Ankle Surgery      ____________________________________________________________________    HPI:       Chief Complaint: Return for a callus and blister plantar left foot.  I last evaluated him on 2/24/2021.  At that time he had an ulceration limited to the depth of deeper skin.  I recommended he follow-up every 1 to 2 months.  His wife assists with wound cares.  She may became concerned when a new blister formed.  There is discoloration.  She was worried he would \"bleed out,\" as he recently started Xarelto.  He denies pain.  He has some form of peripheral neuropathy.  He has attempted to use a cushion insert with a hole cut below the lesion.  He is wearing new athletic shoes.    Prior biopsy of the lesion was negative for malignancy.    *No past medical history on file.*  *No past surgical history on file.*  *  Current Outpatient Medications   Medication Sig Dispense Refill     atorvastatin (LIPITOR) 20 MG tablet daily       diltiazem ER (DILT-XR) 180 MG 24 hr capsule Take by mouth 2 times daily       furosemide (LASIX) 40 MG tablet Take 40 mg by mouth daily       multivitamin w/minerals (MULTI-VITAMIN) tablet Take 1 tablet by mouth daily       omeprazole (PRILOSEC) 40 MG DR capsule Take 40 mg by mouth daily       rivaroxaban ANTICOAGULANT (XARELTO) 20 MG TABS tablet Take 20 mg by mouth daily (with dinner)       tamsulosin (FLOMAX) 0.4 MG capsule Take 2 capsules by mouth daily       triamterene-HCTZ (MAXZIDE-25) 37.5-25 MG tablet Take by mouth daily       metoprolol succinate ER (TOPROL-XL) 25 MG 24 hr tablet Take 25 mg by mouth daily       warfarin " "ANTICOAGULANT (COUMADIN) 1 MG tablet        warfarin ANTICOAGULANT (COUMADIN) 5 MG tablet 8 mg total per day           EXAM:    Vitals: /78   Ht 1.956 m (6' 5\")   Wt (!) 163.7 kg (361 lb)   BMI 42.81 kg/m    BMI: Body mass index is 42.81 kg/m .    Constitutional:  Rubén Bhat is in no apparent distress, appears well-nourished.  Cooperative with history and physical exam.    Vascular:  Pedal pulses are faintly palpable bilaterally for both the DP and PT arteries.  CFT < 3 sec.  No edema.  Pedal hair growth noted.      Neuro:  Alert and oriented x 3. Coordinated gait.  Light touch sensation is diminished to the L4, L5, S1 distributions. No obvious deficits.  No evidence of neurological-based weakness, spasticity, or contracture in the lower extremities.      Protective sensation is diminished in the left foot per [place] Monofilament testing.     Derm: Hyperkeratotic lesion sub-left third and fourth metatarsal region.  Dark pigmentation suggesting intraepidermal bleeding.  From this region running distally is a bullous lesion with purpleish fluid. The skin is intact    Post excisional debridement of the region, there is an area measuring 1.5 cm in width by 3 cm in length of macerated skin.  In the central aspect of the wound is slightly deeper, probing 0.3 cm to a soft endpoint.  This is considered to a level of deeper skin.  The appearance is that of macerated hyperkeratotic skin.    No surrounding erythema or edema.  No malodor or purulence.    Musculoskeletal:    Lower extremity muscle strength is normal.  Patient is ambulatory without an assistive device or brace . High medial longitudinal arches.         Again, thank you for allowing me to participate in the care of your patient.        Sincerely,        Marcelo Feliciano, MILAN    "

## 2021-06-03 ENCOUNTER — OFFICE VISIT (OUTPATIENT)
Dept: PODIATRY | Facility: CLINIC | Age: 76
End: 2021-06-03
Payer: COMMERCIAL

## 2021-06-03 VITALS
SYSTOLIC BLOOD PRESSURE: 124 MMHG | BODY MASS INDEX: 37.19 KG/M2 | DIASTOLIC BLOOD PRESSURE: 74 MMHG | WEIGHT: 315 LBS | HEIGHT: 77 IN

## 2021-06-03 DIAGNOSIS — L97.521 ULCER OF LEFT FOOT, LIMITED TO BREAKDOWN OF SKIN (H): Primary | ICD-10-CM

## 2021-06-03 DIAGNOSIS — R20.8 DECREASED SENSATION OF FOOT: ICD-10-CM

## 2021-06-03 PROCEDURE — 97597 DBRDMT OPN WND 1ST 20 CM/<: CPT | Performed by: PODIATRIST

## 2021-06-03 ASSESSMENT — MIFFLIN-ST. JEOR: SCORE: 2462.65

## 2021-06-03 NOTE — LETTER
"    6/3/2021         RE: Rubén Bhat  [address]  [facility] [address]        Dear Colleague,    Thank you for referring your patient, Rubén Bhat, to the [facility] PODIATRY. Please see a copy of my visit note below.    ASSESSMENT:  Encounter Diagnoses   Name Primary?     Ulcer of left foot, limited to breakdown of skin (H) Yes     Decreased sensation of foot      MEDICAL DECISION MAKING:  No new concerns today.  There has been interval healing since his last visit.  No clinical signs of infection.    Excisional Debridement    The excisional debridement procedure was discussed.  This included the goals of removing non-viable tissue, evaluating the full extent of wound, and promoting wound healing.  Rubén Bhat  provided verba consent.  The \"Time Out\" was called, confirming procedure and location.     Using a sterile #10 scalple, excisional debridment of the left foot ulcer was performed. The hyperkeratotic eschar surrounding the wound was removed, by excising skin edges back to healthy, bleeding tissue. Non-viable tissue was excised.  Debridement was carried out to the depth of deeper skin. The ulcer base was scraped to remove bioburden and promote healing.  The area debrided was less than 20 square cm.   There was moderate bleeding with the procedure. No anesthesia was needed due to peripheral neuropathy.   A sterile dressing was applied.    We reviewed the clinical signs of infection.  And his wife will continue with daily cleansing and application of Betadine to reduce macerated skin.  Follow-up in 1 month.    Disclaimer: This note consists of symbols derived from keyboarding, dictation and/or voice recognition software. As a result, there may be errors in the script that have gone undetected. Please consider this when interpreting information found in this chart.    Marcelo Feliciano DPM, FACFAS, MS    [place] Department of Podiatry/Foot & Ankle " "Surgery      ____________________________________________________________________    HPI:       Rubén returns for evaluation of an ulceration, plantar left foot.  At his last visit the area was debrided via excision.  Reviewed basic wound cares.  He uses cushioned inserts in his shoes.  Recommended he consider a stiffer soled shoe consider custom orthoses.    No new concerns.  I think he has some degree of peripheral neuropathy.    *No past medical history on file.*  *No past surgical history on file.*  *  Current Outpatient Medications   Medication Sig Dispense Refill     atorvastatin (LIPITOR) 20 MG tablet daily       diltiazem ER (DILT-XR) 180 MG 24 hr capsule Take by mouth 2 times daily       furosemide (LASIX) 40 MG tablet Take 40 mg by mouth daily       metoprolol succinate ER (TOPROL-XL) 25 MG 24 hr tablet Take 25 mg by mouth daily       multivitamin w/minerals (MULTI-VITAMIN) tablet Take 1 tablet by mouth daily       omeprazole (PRILOSEC) 40 MG DR capsule Take 40 mg by mouth daily       rivaroxaban ANTICOAGULANT (XARELTO) 20 MG TABS tablet Take 20 mg by mouth daily (with dinner)       tamsulosin (FLOMAX) 0.4 MG capsule Take 2 capsules by mouth daily       triamterene-HCTZ (MAXZIDE-25) 37.5-25 MG tablet Take by mouth daily       warfarin ANTICOAGULANT (COUMADIN) 1 MG tablet        warfarin ANTICOAGULANT (COUMADIN) 5 MG tablet 8 mg total per day           EXAM:    Vitals: /74   Ht 1.956 m (6' 5\")   Wt (!) 161 kg (355 lb)   BMI 42.10 kg/m    BMI: Body mass index is 42.1 kg/m .    Derm: Hyperkeratotic lesion sub-left third and fourth metatarsal region.  Dark pigmentation suggesting intraepidermal bleeding.      Post excisional debridement of the region, involved area measures 1.5 cm in width by 3 cm in length.  Much of the macerated skin has resolved.  There remains an area distally.  This wound is to the depth of deeper skin.  No surrounding erythema or edema.  No malodor or purulence.      Again, " thank you for allowing me to participate in the care of your patient.        Sincerely,        Marcelo Feliicano DPM

## 2021-06-03 NOTE — PROGRESS NOTES
"ASSESSMENT:  Encounter Diagnoses   Name Primary?     Ulcer of left foot, limited to breakdown of skin (H) Yes     Decreased sensation of foot      MEDICAL DECISION MAKING:  No new concerns today.  There has been interval healing since his last visit.  No clinical signs of infection.    Excisional Debridement    The excisional debridement procedure was discussed.  This included the goals of removing non-viable tissue, evaluating the full extent of wound, and promoting wound healing.  Rubén Bhat  provided verba consent.  The \"Time Out\" was called, confirming procedure and location.     Using a sterile #10 scalple, excisional debridment of the left foot ulcer was performed. The hyperkeratotic eschar surrounding the wound was removed, by excising skin edges back to healthy, bleeding tissue. Non-viable tissue was excised.  Debridement was carried out to the depth of deeper skin. The ulcer base was scraped to remove bioburden and promote healing.  The area debrided was less than 20 square cm.   There was moderate bleeding with the procedure. No anesthesia was needed due to peripheral neuropathy.   A sterile dressing was applied.    We reviewed the clinical signs of infection.  And his wife will continue with daily cleansing and application of Betadine to reduce macerated skin.  Follow-up in 1 month.    Disclaimer: This note consists of symbols derived from keyboarding, dictation and/or voice recognition software. As a result, there may be errors in the script that have gone undetected. Please consider this when interpreting information found in this chart.    Marcelo Feliciano DPM, FACFAS, MS    [place] Department of Podiatry/Foot & Ankle Surgery      ____________________________________________________________________    HPI:       Rubén returns for evaluation of an ulceration, plantar left foot.  At his last visit the area was debrided via excision.  Reviewed basic wound cares.  He uses cushioned inserts in his shoes.  " "Recommended he consider a stiffer soled shoe consider custom orthoses.    No new concerns.  I think he has some degree of peripheral neuropathy.    *No past medical history on file.*  *No past surgical history on file.*  *  Current Outpatient Medications   Medication Sig Dispense Refill     atorvastatin (LIPITOR) 20 MG tablet daily       diltiazem ER (DILT-XR) 180 MG 24 hr capsule Take by mouth 2 times daily       furosemide (LASIX) 40 MG tablet Take 40 mg by mouth daily       metoprolol succinate ER (TOPROL-XL) 25 MG 24 hr tablet Take 25 mg by mouth daily       multivitamin w/minerals (MULTI-VITAMIN) tablet Take 1 tablet by mouth daily       omeprazole (PRILOSEC) 40 MG DR capsule Take 40 mg by mouth daily       rivaroxaban ANTICOAGULANT (XARELTO) 20 MG TABS tablet Take 20 mg by mouth daily (with dinner)       tamsulosin (FLOMAX) 0.4 MG capsule Take 2 capsules by mouth daily       triamterene-HCTZ (MAXZIDE-25) 37.5-25 MG tablet Take by mouth daily       warfarin ANTICOAGULANT (COUMADIN) 1 MG tablet        warfarin ANTICOAGULANT (COUMADIN) 5 MG tablet 8 mg total per day           EXAM:    Vitals: /74   Ht 1.956 m (6' 5\")   Wt (!) 161 kg (355 lb)   BMI 42.10 kg/m    BMI: Body mass index is 42.1 kg/m .    Derm: Hyperkeratotic lesion sub-left third and fourth metatarsal region.  Dark pigmentation suggesting intraepidermal bleeding.      Post excisional debridement of the region, involved area measures 1.5 cm in width by 3 cm in length.  Much of the macerated skin has resolved.  There remains an area distally.  This wound is to the depth of deeper skin.  No surrounding erythema or edema.  No malodor or purulence.  "

## 2021-07-08 ENCOUNTER — OFFICE VISIT (OUTPATIENT)
Dept: PODIATRY | Facility: CLINIC | Age: 76
End: 2021-07-08
Payer: COMMERCIAL

## 2021-07-08 VITALS
SYSTOLIC BLOOD PRESSURE: 136 MMHG | BODY MASS INDEX: 37.19 KG/M2 | WEIGHT: 315 LBS | DIASTOLIC BLOOD PRESSURE: 76 MMHG | HEIGHT: 77 IN

## 2021-07-08 DIAGNOSIS — L97.521 ULCER OF LEFT FOOT, LIMITED TO BREAKDOWN OF SKIN (H): Primary | ICD-10-CM

## 2021-07-08 DIAGNOSIS — R20.8 DECREASED SENSATION OF FOOT: ICD-10-CM

## 2021-07-08 PROCEDURE — 99213 OFFICE O/P EST LOW 20 MIN: CPT | Mod: 25 | Performed by: PODIATRIST

## 2021-07-08 PROCEDURE — 97597 DBRDMT OPN WND 1ST 20 CM/<: CPT | Performed by: PODIATRIST

## 2021-07-08 ASSESSMENT — MIFFLIN-ST. JEOR: SCORE: 2494.4

## 2021-07-08 NOTE — PROGRESS NOTES
"ASSESSMENT:  Encounter Diagnoses   Name Primary?     Ulcer of left foot, limited to breakdown of skin (H) Yes     Decreased sensation of foot      MEDICAL DECISION MAKING:  No clinical signs of infection.  I explained that I think this wound developes from shear forces, plantar forefoot.  There is deep separation of skin and bullous lesion formation.  The fluid leads to the maceration.  He is at risk for developing an infection.  Fortunately the wound has not reached a depth of the fat layer.  I did review why a stiffer soled shoe might help reduce forces on the forefoot.  He will consider this.    Excisional Debridement    The excisional debridement procedure was discussed.  This included the goals of removing non-viable tissue, evaluating the full extent of wound, and promoting wound healing.  Rubén Bhat  provided verba consent.  The \"Time Out\" was called, confirming procedure and location.     Using a sterile #15 scalpel and tissue nippers, excisional debridment of the left foot ulcer was performed. The hyperkeratotic eschar surrounding the wound was removed, by excising skin edges back to healthy, bleeding tissue. Non-viable tissue was excised.  Debridement was carried out to the depth of deeper skin. The ulcer base was scraped to remove bioburden and promote healing.  The area debrided was less than 20 square cm.   There was moderate bleeding with the procedure. No anesthesia was needed due to peripheral neuropathy.   A sterile dressing was applied.    We reviewed the clinical signs of infection.  Daily cleansing of the wound, topical antibiotic, light dressing. Once the hyperkeratotic skin develops, filing is recommended.     Follow-up in 1 month.    Disclaimer: This note consists of symbols derived from keyboarding, dictation and/or voice recognition software. As a result, there may be errors in the script that have gone undetected. Please consider this when interpreting information found in this " "chart.    Marcelo Feliciano, MILAN, FACFAS, MS    [place] Department of Podiatry/Foot & Ankle Surgery      ____________________________________________________________________    HPI:       Rubén returns for evaluation of an ulceration, plantar left foot.  At his last visit the area was debrided via excision.  Reviewed basic wound cares.  There was interval healing. I recommended he consider a stiffer soled shoe consider custom orthoses. He asks again about stiffer shoes.    He and his wife are more concerned about the wound today.  She states she is the one that monitors, cleanses the wound, does dressing change and files the callus that develops.  However, she had hip surgery 3 weeks ago and was unable to help him with the wound as often as typical.    I think he has some degree of peripheral neuropathy.    *No past medical history on file.*  *No past surgical history on file.*  *  Current Outpatient Medications   Medication Sig Dispense Refill     atorvastatin (LIPITOR) 20 MG tablet daily       diltiazem ER (DILT-XR) 180 MG 24 hr capsule Take by mouth 2 times daily       furosemide (LASIX) 40 MG tablet Take 40 mg by mouth daily       metoprolol succinate ER (TOPROL-XL) 25 MG 24 hr tablet Take 25 mg by mouth daily       multivitamin w/minerals (MULTI-VITAMIN) tablet Take 1 tablet by mouth daily       omeprazole (PRILOSEC) 40 MG DR capsule Take 40 mg by mouth daily       rivaroxaban ANTICOAGULANT (XARELTO) 20 MG TABS tablet Take 20 mg by mouth daily (with dinner)       tamsulosin (FLOMAX) 0.4 MG capsule Take 2 capsules by mouth daily       triamterene-HCTZ (MAXZIDE-25) 37.5-25 MG tablet Take by mouth daily       warfarin ANTICOAGULANT (COUMADIN) 1 MG tablet        warfarin ANTICOAGULANT (COUMADIN) 5 MG tablet 8 mg total per day           EXAM:    Vitals: /76   Ht 1.956 m (6' 5\")   Wt (!) 164.2 kg (362 lb)   BMI 42.93 kg/m    BMI: Body mass index is 42.93 kg/m .    Derm: Hyperkeratotic lesion sub-left third and " fourth metatarsal region.  Dark pigmentation suggesting intraepidermal bleeding.  In general it looks like an oval deep blister.     Post excisional debridement of the region, involved area measures 1.5 cm in width by 3 cm in length.  A deeper central part is 1 cm in diameter.  The wound base appears to be largely macerated skin.  It does not appear to have a depth reaching the fat layer.  No surrounding erythema or edema.  No malodor or purulence.

## 2021-07-08 NOTE — LETTER
"    7/8/2021         RE: Rubén Bhat  [address]  [facility] [address]        Dear Colleague,    Thank you for referring your patient, Rubén Bhat, to the [facility] PODIATRY. Please see a copy of my visit note below.    ASSESSMENT:  Encounter Diagnoses   Name Primary?     Ulcer of left foot, limited to breakdown of skin (H) Yes     Decreased sensation of foot      MEDICAL DECISION MAKING:  No clinical signs of infection.  I explained that I think this wound developes from shear forces, plantar forefoot.  There is deep separation of skin and bullous lesion formation.  The fluid leads to the maceration.  He is at risk for developing an infection.  Fortunately the wound has not reached a depth of the fat layer.  I did review why a stiffer soled shoe might help reduce forces on the forefoot.  He will consider this.    Excisional Debridement    The excisional debridement procedure was discussed.  This included the goals of removing non-viable tissue, evaluating the full extent of wound, and promoting wound healing.  Rubén Bhat  provided verba consent.  The \"Time Out\" was called, confirming procedure and location.     Using a sterile #15 scalpel and tissue nippers, excisional debridment of the left foot ulcer was performed. The hyperkeratotic eschar surrounding the wound was removed, by excising skin edges back to healthy, bleeding tissue. Non-viable tissue was excised.  Debridement was carried out to the depth of deeper skin. The ulcer base was scraped to remove bioburden and promote healing.  The area debrided was less than 20 square cm.   There was moderate bleeding with the procedure. No anesthesia was needed due to peripheral neuropathy.   A sterile dressing was applied.    We reviewed the clinical signs of infection.  Daily cleansing of the wound, topical antibiotic, light dressing. Once the hyperkeratotic skin develops, filing is recommended.     Follow-up in 1 " month.    Disclaimer: This note consists of symbols derived from keyboarding, dictation and/or voice recognition software. As a result, there may be errors in the script that have gone undetected. Please consider this when interpreting information found in this chart.    Marcelo Felicinao DPM, FACFAS, MS    [place] Department of Podiatry/Foot & Ankle Surgery      ____________________________________________________________________    HPI:       Rubén returns for evaluation of an ulceration, plantar left foot.  At his last visit the area was debrided via excision.  Reviewed basic wound cares.  There was interval healing. I recommended he consider a stiffer soled shoe consider custom orthoses. He asks again about stiffer shoes.    He and his wife are more concerned about the wound today.  She states she is the one that monitors, cleanses the wound, does dressing change and files the callus that develops.  However, she had hip surgery 3 weeks ago and was unable to help him with the wound as often as typical.    I think he has some degree of peripheral neuropathy.    *No past medical history on file.*  *No past surgical history on file.*  *  Current Outpatient Medications   Medication Sig Dispense Refill     atorvastatin (LIPITOR) 20 MG tablet daily       diltiazem ER (DILT-XR) 180 MG 24 hr capsule Take by mouth 2 times daily       furosemide (LASIX) 40 MG tablet Take 40 mg by mouth daily       metoprolol succinate ER (TOPROL-XL) 25 MG 24 hr tablet Take 25 mg by mouth daily       multivitamin w/minerals (MULTI-VITAMIN) tablet Take 1 tablet by mouth daily       omeprazole (PRILOSEC) 40 MG DR capsule Take 40 mg by mouth daily       rivaroxaban ANTICOAGULANT (XARELTO) 20 MG TABS tablet Take 20 mg by mouth daily (with dinner)       tamsulosin (FLOMAX) 0.4 MG capsule Take 2 capsules by mouth daily       triamterene-HCTZ (MAXZIDE-25) 37.5-25 MG tablet Take by mouth daily       warfarin ANTICOAGULANT (COUMADIN) 1 MG tablet         "warfarin ANTICOAGULANT (COUMADIN) 5 MG tablet 8 mg total per day           EXAM:    Vitals: /76   Ht 1.956 m (6' 5\")   Wt (!) 164.2 kg (362 lb)   BMI 42.93 kg/m    BMI: Body mass index is 42.93 kg/m .    Derm: Hyperkeratotic lesion sub-left third and fourth metatarsal region.  Dark pigmentation suggesting intraepidermal bleeding.  In general it looks like an oval deep blister.     Post excisional debridement of the region, involved area measures 1.5 cm in width by 3 cm in length.  A deeper central part is 1 cm in diameter.  The wound base appears to be largely macerated skin.  It does not appear to have a depth reaching the fat layer.  No surrounding erythema or edema.  No malodor or purulence.      Again, thank you for allowing me to participate in the care of your patient.        Sincerely,        Marcelo Feliciano, MILAN    "

## 2021-07-08 NOTE — PATIENT INSTRUCTIONS
Thank you for choosing [facility] Podiatry / Foot & Ankle Surgery!    DR. ARANA'S CLINIC LOCATIONS     [facility] [phone]   [address] APPOINTMENTS: [phone]   [place] [address] BILLING QUESTIONS: [phone]  [phone] RADIOLOGY: [phone]       [place]    [address]    [place] [address]    [phone]  [phone]      [facility] LOCATIONS  [address]  [phone]   [address]  [phone] [place] [address]  [phone]   [address]  [phone]   [place]  [address]  [phone] [place] [address]  [phone]   [place]  [address]  [phone]   [place]  [address]  [phone] [place]  [address]  [phone]

## 2021-08-16 ENCOUNTER — TELEPHONE (OUTPATIENT)
Dept: PODIATRY | Facility: CLINIC | Age: 76
End: 2021-08-16

## 2021-08-16 NOTE — TELEPHONE ENCOUNTER
Wife, Soco, calls for patient.   No consent to communicate on file to speak with her.   Asked to speak with patient who gave verbal permission to speak with her.     She states patient's left foot wound is worsening the past few day. She normally uses some type of electric grinding device to grind down the callus on the bottom of his left foot. He has a wound on the inside of the callus that is now deeper and larger than it was. The inside also feels more spongy than usual. Patient was having more pain in the area yesterday as well as on the top of his left foot. Today the pain is not as bad.   She states it did have some bloody drainage a few days ago and is not longer draining. She has difficulty with sense of smell and is not sure if there is an odor or not.   She denied redness around wound or on top of foot. Patient denies fever, chills, nausea, or feeling ill.   She is cleansing with Betadine and then using triple antibiotic ointment.     She was informed that we do not have any providers in the office until Wednesday and no available appointments. Will check into further and get back with her.     FLORENCIA Goodman RN

## 2021-08-16 NOTE — TELEPHONE ENCOUNTER
Dr. Feliciano's schedule was opened up for tomorrow.   Phone call to Soco, and appointment scheduled for 8/17/21 at 11:00. Asked that patient arrive at 10:45 and informed of late policy.   Recommended that if patient developed a fever, chills, nausea, or feeling ill, that he should go to the ED to be evaluated. She verbalized understanding.     FLORENCIA Goodman RN

## 2021-08-17 ENCOUNTER — OFFICE VISIT (OUTPATIENT)
Dept: PODIATRY | Facility: CLINIC | Age: 76
End: 2021-08-17
Payer: COMMERCIAL

## 2021-08-17 VITALS
HEIGHT: 77 IN | WEIGHT: 315 LBS | SYSTOLIC BLOOD PRESSURE: 116 MMHG | BODY MASS INDEX: 37.19 KG/M2 | DIASTOLIC BLOOD PRESSURE: 84 MMHG

## 2021-08-17 DIAGNOSIS — L03.119 CELLULITIS OF FOOT: ICD-10-CM

## 2021-08-17 DIAGNOSIS — L97.522 ULCER OF LEFT FOOT WITH FAT LAYER EXPOSED (H): Primary | ICD-10-CM

## 2021-08-17 DIAGNOSIS — M79.672 LEFT FOOT PAIN: ICD-10-CM

## 2021-08-17 PROCEDURE — 97597 DBRDMT OPN WND 1ST 20 CM/<: CPT | Performed by: PODIATRIST

## 2021-08-17 PROCEDURE — 99214 OFFICE O/P EST MOD 30 MIN: CPT | Mod: 25 | Performed by: PODIATRIST

## 2021-08-17 ASSESSMENT — MIFFLIN-ST. JEOR: SCORE: 2457.65

## 2021-08-17 NOTE — LETTER
"    8/17/2021         RE: Rubén Bhat  [address]  [facility] [address]        Dear Colleague,    Thank you for referring your patient, Rubén Bhat, to the [facility] PODIATRY. Please see a copy of my visit note below.    ASSESSMENT:  Encounter Diagnoses   Name Primary?     Ulcer of left foot with fat layer exposed (H) Yes     Cellulitis of foot      Left foot pain      MEDICAL DECISION MAKING:    Today I am concerned there might be some associated cellulitis.  The clinical signs of infection were reviewed.  In addition to the erythema, he reports some discomfort.  Augmentin 125-875mg BID x 10 days prescribed.  Again, this is thought to be a skin lesion from faulty biomechanics and peripheral neuropathy.  He inquired if it would be worthwhile repeating a biopsy.  I am not opposed to this.  We will schedule an appointment in 2 weeks at which time we will attempt to take a deeper biopsy than the last time.  This should be more meaningful.  He is to continue with his athletic shoes and accommodative inserts.  He understands that slippers and other nonsupportive footwear will not help.  If this wound becomes deeper, imaging will be indicated.    Excisional Debridement    The excisional debridement procedure was discussed.  This included the goals of removing non-viable tissue, evaluating the full extent of wound, and promoting wound healing.  Rubén Bhat  provided verba consent.  The \"Time Out\" was called, confirming procedure and location.     Using a sterile #15 scalpel and tissue nippers, excisional debridment of the left foot ulcer was performed. The hyperkeratotic eschar surrounding the wound was removed, by excising skin edges back to healthy, bleeding tissue. Non-viable tissue was excised.  Debridement was carried out to the depth of deeper skin. The ulcer base was scraped to remove bioburden and promote healing.  The area debrided was less than 20 square cm.   There was moderate " bleeding with the procedure. No anesthesia was needed due to peripheral neuropathy.   A sterile dressing was applied.    We reviewed the clinical signs of infection.  Daily cleansing of the wound, topical antibiotic, light dressing. Once the hyperkeratotic skin develops, filing is recommended.     Follow-up in 1 month.    Disclaimer: This note consists of symbols derived from keyboarding, dictation and/or voice recognition software. As a result, there may be errors in the script that have gone undetected. Please consider this when interpreting information found in this chart.    Marcelo Feliciano DPM, FACFAS, MS    [place] Department of Podiatry/Foot & Ankle Surgery      ____________________________________________________________________    HPI:       Rubén returns for evaluation of an ulceration, plantar left foot. He does this monthly.   The wound typically requires excisional debridement.  We have discussed how stiffer soled shoes can help offload the forefoot.  He presents in some quality athletic shoes.  He has placed an accommodative type insert within the shoe and cut an aperture below the ulcerative area.    Reports having worn slippers at home and questions if this has caused a problem.  He is experiencing some pain near his toes and has developed redness on top of the foot.  This area is tender as well.    I think he has some degree of peripheral neuropathy.    *No past medical history on file.*  *No past surgical history on file.*  *  Current Outpatient Medications   Medication Sig Dispense Refill     atorvastatin (LIPITOR) 20 MG tablet daily       diltiazem ER (DILT-XR) 180 MG 24 hr capsule Take by mouth 2 times daily       furosemide (LASIX) 40 MG tablet Take 40 mg by mouth daily       metoprolol succinate ER (TOPROL-XL) 25 MG 24 hr tablet Take 25 mg by mouth daily       multivitamin w/minerals (MULTI-VITAMIN) tablet Take 1 tablet by mouth daily       omeprazole (PRILOSEC) 40 MG DR capsule Take 40 mg by  "mouth daily       rivaroxaban ANTICOAGULANT (XARELTO) 20 MG TABS tablet Take 20 mg by mouth daily (with dinner)       tamsulosin (FLOMAX) 0.4 MG capsule Take 2 capsules by mouth daily       triamterene-HCTZ (MAXZIDE-25) 37.5-25 MG tablet Take by mouth daily       warfarin ANTICOAGULANT (COUMADIN) 1 MG tablet        warfarin ANTICOAGULANT (COUMADIN) 5 MG tablet 8 mg total per day           EXAM:    Vitals: /84   Ht 1.956 m (6' 5\")   Wt (!) 161 kg (355 lb)   BMI 42.10 kg/m    BMI: Body mass index is 42.1 kg/m .    Derm: Hyperkeratotic lesion sub-left third and fourth metatarsal region.  Dark pigmentation suggesting intraepidermal bleeding.  In general it looks like an oval deep blister.     Post excisional debridement of the region, involved area measures 1.5 cm in width by 3 cm in length.  It probes over 1/2 cm in depth.  Does not probe to bone.  Today the depth is to the fat layer.  There is macerated skin surrounding this.    Light erythema on the dorsal foot.      Again, thank you for allowing me to participate in the care of your patient.        Sincerely,        Marcelo Feliciano, MILAN    "

## 2021-09-09 ENCOUNTER — OFFICE VISIT (OUTPATIENT)
Dept: PODIATRY | Facility: CLINIC | Age: 76
End: 2021-09-09
Payer: COMMERCIAL

## 2021-09-09 ENCOUNTER — ANCILLARY PROCEDURE (OUTPATIENT)
Dept: GENERAL RADIOLOGY | Facility: CLINIC | Age: 76
End: 2021-09-09
Attending: PODIATRIST
Payer: COMMERCIAL

## 2021-09-09 VITALS
BODY MASS INDEX: 37.19 KG/M2 | HEIGHT: 77 IN | SYSTOLIC BLOOD PRESSURE: 120 MMHG | DIASTOLIC BLOOD PRESSURE: 72 MMHG | WEIGHT: 315 LBS

## 2021-09-09 DIAGNOSIS — L97.522 ULCER OF LEFT FOOT WITH FAT LAYER EXPOSED (H): ICD-10-CM

## 2021-09-09 DIAGNOSIS — M79.672 LEFT FOOT PAIN: Primary | ICD-10-CM

## 2021-09-09 PROCEDURE — 99213 OFFICE O/P EST LOW 20 MIN: CPT | Mod: 25 | Performed by: PODIATRIST

## 2021-09-09 PROCEDURE — 11104 PUNCH BX SKIN SINGLE LESION: CPT | Performed by: PODIATRIST

## 2021-09-09 PROCEDURE — 88305 TISSUE EXAM BY PATHOLOGIST: CPT | Performed by: PATHOLOGY

## 2021-09-09 PROCEDURE — 73630 X-RAY EXAM OF FOOT: CPT | Mod: LT | Performed by: RADIOLOGY

## 2021-09-09 ASSESSMENT — MIFFLIN-ST. JEOR: SCORE: 2457.65

## 2021-09-09 NOTE — PROGRESS NOTES
ASSESSMENT:  Encounter Diagnoses   Name Primary?     Left foot pain Yes     Ulcer of left foot with fat layer exposed (H)      MEDICAL DECISION MAKING:    Procedure:    The punch biopsy procedure was discussed with the Rubén.  Verbal and written consent obtained.   No local anesthesia needed, due to peripheral neuropathy.  The area was prepped and draped in a sterile fashion.  Next a 3mm punch biopsy was obtained from 2 areas near the central aspect of the wound.  I consider these biopsies deeper than previous biopsy, as the wound was debrided first.  He tolerated the procedure well.  A sterile dressing was applied to the wound.      Pt to remove dressing tomorrow and check for increasing redness, drainage, malodor, purulence.  He is to then apply a bandaid.  He is instructed to call if there is concern.  We reviewed basic wound cares.    I personally reviewed the x-ray images with Rubén.  No acute findings.  The differential diagnosis includes stress reaction of bone versus other mechanical discomfort from overload.  Body mass index is 42.1 kg/m .  He is to continue with his athletic shoes and orthoses.  An immobilizing boot was offered and he does not think it is necessary today.    I have asked him to follow-up in 1 month.    Disclaimer: This note consists of symbols derived from keyboarding, dictation and/or voice recognition software. As a result, there may be errors in the script that have gone undetected. Please consider this when interpreting information found in this chart.    Marcelo Feliciano DPM, FACFAS, MS    [place] Department of Podiatry/Foot & Ankle Surgery      ____________________________________________________________________    HPI:       Rubén presents today for for repeat biopsy of a chronic wound on the plantar aspect of his left foot.  This was done in the past and negative for malignancy.  However, the wound is chronic and warrants repeat biopsy in my opinion.    He also reports having left  "dorsal foot pain that recently developed.    *No past medical history on file.*  *No past surgical history on file.*  *  Current Outpatient Medications   Medication Sig Dispense Refill     amoxicillin-clavulanate (AUGMENTIN) 875-125 MG tablet Take 1 tablet by mouth 2 times daily 20 tablet 0     atorvastatin (LIPITOR) 20 MG tablet daily       diltiazem ER (DILT-XR) 180 MG 24 hr capsule Take by mouth 2 times daily       furosemide (LASIX) 40 MG tablet Take 40 mg by mouth daily       metoprolol succinate ER (TOPROL-XL) 25 MG 24 hr tablet Take 25 mg by mouth daily       multivitamin w/minerals (MULTI-VITAMIN) tablet Take 1 tablet by mouth daily       omeprazole (PRILOSEC) 40 MG DR capsule Take 40 mg by mouth daily       rivaroxaban ANTICOAGULANT (XARELTO) 20 MG TABS tablet Take 20 mg by mouth daily (with dinner)       tamsulosin (FLOMAX) 0.4 MG capsule Take 2 capsules by mouth daily       triamterene-HCTZ (MAXZIDE-25) 37.5-25 MG tablet Take by mouth daily       warfarin ANTICOAGULANT (COUMADIN) 1 MG tablet        warfarin ANTICOAGULANT (COUMADIN) 5 MG tablet 8 mg total per day           EXAM:    Vitals: /72   Ht 1.956 m (6' 5\")   Wt (!) 161 kg (355 lb)   BMI 42.10 kg/m    BMI: Body mass index is 42.1 kg/m .    Vascular:  Pedal pulses are palpable for both the DP and PT arteries.  CFT < 3 sec.  No edema.      Neuro: Light touch sensation is diminished to the L4, L5, S1 distributions  No evidence of weakness, spasticity, or contracture in the lower extremities.     Derm:   Ulceration plantar left third and fourth metatarsal head region.  There is hyperkeratotic eschar.  Below this, macerated tissue.  The midline is deeper measuring 0.3 cm in depth.  It does not probe to bone.  Post debridement, the wound measures 3 cm in length by 1 cm in width by 0.3 cm in depth, to the layer of the fat.  No associated erythema, edema or purulence.    Musculoskeletal:    Lower extremity muscle strength is normal.  He has some " pain on palpation over the dorsal aspect of the left third and fourth metatarsals.    XR Left Foot:  Negative for acute findings. No fracture or periosteal reaction.

## 2021-09-09 NOTE — LETTER
9/9/2021         RE: Rubén Bhat  [address]  [facility] [address]        Dear Colleague,    Thank you for referring your patient, Rubén Bhat, to the [facility] PODIATRY. Please see a copy of my visit note below.    ASSESSMENT:  Encounter Diagnoses   Name Primary?     Left foot pain Yes     Ulcer of left foot with fat layer exposed (H)      MEDICAL DECISION MAKING:    Procedure:    The punch biopsy procedure was discussed with the Rubén.  Verbal and written consent obtained.   No local anesthesia needed, due to peripheral neuropathy.  The area was prepped and draped in a sterile fashion.  Next a 3mm punch biopsy was obtained from 2 areas near the central aspect of the wound.  I consider these biopsies deeper than previous biopsy, as the wound was debrided first.  He tolerated the procedure well.  A sterile dressing was applied to the wound.      Pt to remove dressing tomorrow and check for increasing redness, drainage, malodor, purulence.  He is to then apply a bandaid.  He is instructed to call if there is concern.  We reviewed basic wound cares.    I personally reviewed the x-ray images with Rubén.  No acute findings.  The differential diagnosis includes stress reaction of bone versus other mechanical discomfort from overload.  Body mass index is 42.1 kg/m .  He is to continue with his athletic shoes and orthoses.  An immobilizing boot was offered and he does not think it is necessary today.    I have asked him to follow-up in 1 month.    Disclaimer: This note consists of symbols derived from keyboarding, dictation and/or voice recognition software. As a result, there may be errors in the script that have gone undetected. Please consider this when interpreting information found in this chart.    Marcelo Feliciano, MILAN, FACFAS, MS    [place] Department of Podiatry/Foot & Ankle Surgery      ____________________________________________________________________    HPI:       Rubén  "presents today for for repeat biopsy of a chronic wound on the plantar aspect of his left foot.  This was done in the past and negative for malignancy.  However, the wound is chronic and warrants repeat biopsy in my opinion.    He also reports having left dorsal foot pain that recently developed.    *No past medical history on file.*  *No past surgical history on file.*  *  Current Outpatient Medications   Medication Sig Dispense Refill     amoxicillin-clavulanate (AUGMENTIN) 875-125 MG tablet Take 1 tablet by mouth 2 times daily 20 tablet 0     atorvastatin (LIPITOR) 20 MG tablet daily       diltiazem ER (DILT-XR) 180 MG 24 hr capsule Take by mouth 2 times daily       furosemide (LASIX) 40 MG tablet Take 40 mg by mouth daily       metoprolol succinate ER (TOPROL-XL) 25 MG 24 hr tablet Take 25 mg by mouth daily       multivitamin w/minerals (MULTI-VITAMIN) tablet Take 1 tablet by mouth daily       omeprazole (PRILOSEC) 40 MG DR capsule Take 40 mg by mouth daily       rivaroxaban ANTICOAGULANT (XARELTO) 20 MG TABS tablet Take 20 mg by mouth daily (with dinner)       tamsulosin (FLOMAX) 0.4 MG capsule Take 2 capsules by mouth daily       triamterene-HCTZ (MAXZIDE-25) 37.5-25 MG tablet Take by mouth daily       warfarin ANTICOAGULANT (COUMADIN) 1 MG tablet        warfarin ANTICOAGULANT (COUMADIN) 5 MG tablet 8 mg total per day           EXAM:    Vitals: /72   Ht 1.956 m (6' 5\")   Wt (!) 161 kg (355 lb)   BMI 42.10 kg/m    BMI: Body mass index is 42.1 kg/m .    Vascular:  Pedal pulses are palpable for both the DP and PT arteries.  CFT < 3 sec.  No edema.      Neuro: Light touch sensation is diminished to the L4, L5, S1 distributions  No evidence of weakness, spasticity, or contracture in the lower extremities.     Derm:   Ulceration plantar left third and fourth metatarsal head region.  There is hyperkeratotic eschar.  Below this, macerated tissue.  The midline is deeper measuring 0.3 cm in depth.  It does not " probe to bone.  Post debridement, the wound measures 3 cm in length by 1 cm in width by 0.3 cm in depth, to the layer of the fat.  No associated erythema, edema or purulence.    Musculoskeletal:    Lower extremity muscle strength is normal.  He has some pain on palpation over the dorsal aspect of the left third and fourth metatarsals.    XR Left Foot:  Negative for acute findings. No fracture or periosteal reaction.                Again, thank you for allowing me to participate in the care of your patient.        Sincerely,        Marcelo Feliciano DPM

## 2021-09-14 LAB
PATH REPORT.COMMENTS IMP SPEC: NORMAL
PATH REPORT.FINAL DX SPEC: NORMAL
PATH REPORT.GROSS SPEC: NORMAL
PATH REPORT.MICROSCOPIC SPEC OTHER STN: NORMAL
PATH REPORT.RELEVANT HX SPEC: NORMAL
PHOTO IMAGE: NORMAL

## 2022-08-14 ENCOUNTER — TELEPHONE (OUTPATIENT)
Dept: NURSING | Facility: CLINIC | Age: 77
End: 2022-08-14

## 2022-08-14 NOTE — TELEPHONE ENCOUNTER
Patient's wife calling. Patient is asleep and there is no consent to communicate on file. Calling to ask about CT scan results. Informed caller that we do not have access to all of the patient's records from [facility]. Advised calling clinic during business. Caller verbalized understanding.      Yenifer Kilgore RN  08/14/22 7:54 AM  [facility] Nurse Advisor